# Patient Record
Sex: FEMALE | URBAN - METROPOLITAN AREA
[De-identification: names, ages, dates, MRNs, and addresses within clinical notes are randomized per-mention and may not be internally consistent; named-entity substitution may affect disease eponyms.]

---

## 2021-01-08 ENCOUNTER — APPOINTMENT (RX ONLY)
Dept: URBAN - METROPOLITAN AREA CLINIC 55 | Facility: CLINIC | Age: 56
Setting detail: DERMATOLOGY
End: 2021-01-08

## 2021-01-08 DIAGNOSIS — Z41.9 ENCOUNTER FOR PROCEDURE FOR PURPOSES OTHER THAN REMEDYING HEALTH STATE, UNSPECIFIED: ICD-10-CM

## 2021-01-08 PROCEDURE — ? SCULPTRA

## 2021-01-08 NOTE — PROCEDURE: SCULPTRA
Show Tear Troughs Volume?: Yes
Anesthesia Type: 1% lidocaine with epinephrine
Left Temple Hollow Filler Volume In Cc: 0
Lot #: [id number]
Injection Technique: The Sculptra was injected to the areas shown with a 25g cannula after prepping the skin with alcohol and/or chlorhexidine and providing appropriate anesthesia.
Show Right And Left Jawline Volume?: No
Dilution Method: The Sculptra was reconstituted with 8cc sterile water and 2cc 2% plain lidocaine for each vial.
Anesthesia Volume In Cc: 0.5
Vials Reconstituted (Required For Inventory): 1
Consent: Written consent was obtained.
Volumizer: Sculptra
Post-Care Instructions: After care instructions were provided to the patient.
Map Statement: See Attached Map for Complete Details.
Expiration Date (Month Year): 6/30/23
Detail Level: Simple
Additional Anesthesia Volume In Cc: 6

## 2021-08-04 ENCOUNTER — APPOINTMENT (RX ONLY)
Dept: URBAN - METROPOLITAN AREA CLINIC 55 | Facility: CLINIC | Age: 56
Setting detail: DERMATOLOGY
End: 2021-08-04

## 2021-08-04 DIAGNOSIS — Z41.9 ENCOUNTER FOR PROCEDURE FOR PURPOSES OTHER THAN REMEDYING HEALTH STATE, UNSPECIFIED: ICD-10-CM

## 2021-08-04 PROCEDURE — ? FILLERS

## 2021-08-04 PROCEDURE — ? BOTOX

## 2021-08-04 NOTE — PROCEDURE: BOTOX
Show Ucl Units: No
Additional Area 4 Units: 0
Glabellar Complex Units: 12
Show Nasal Units: Yes
Detail Level: Zone
Dilution (U/0.1 Cc): 4
Lot #: [id number]
Expiration Date (Month Year): 10/2023
Consent: Written consent was obtained.
Forehead Units: 14
Post-Care Instructions: After care instructions were provided to the patient.

## 2021-08-04 NOTE — PROCEDURE: FILLERS
Decollete Filler Volume In Cc: 0
Include Cannula Information In Note?: No
Expiration Date (Month Year): 01/2023
Expiration Date (Month Year): 01/2024
Include Cannula Size?: 27G
Anesthesia Volume In Cc: 0.5
Detail Level: Simple
Post-Care Instructions: After care instructions were provided to the patient.
Filler: Pia Bruno
Map Statment: See Attach Map for Complete Details
Consent: Written consent was obtained.
Filler: Restylane Silk
Lot #: [id number]
Anesthesia Type: 1% lidocaine with epinephrine
Lot #: [id number]

## 2021-12-21 ENCOUNTER — APPOINTMENT (RX ONLY)
Dept: URBAN - METROPOLITAN AREA CLINIC 55 | Facility: CLINIC | Age: 56
Setting detail: DERMATOLOGY
End: 2021-12-21

## 2021-12-21 DIAGNOSIS — Z41.9 ENCOUNTER FOR PROCEDURE FOR PURPOSES OTHER THAN REMEDYING HEALTH STATE, UNSPECIFIED: ICD-10-CM

## 2021-12-21 PROCEDURE — ? PRO-NOX

## 2021-12-21 PROCEDURE — ? FRAXEL

## 2021-12-21 NOTE — PROCEDURE: PRO-NOX
Detail Level: Zone
Post-Care Instructions: The patient was instructed to call the office if they had any lasting side effects or concerns.
Consent: Written consent obtained, risks reviewed including but not limited to euphoria, light-headedness, nausea, vomiting and dizziness.

## 2021-12-21 NOTE — PROCEDURE: FRAXEL
Indication: dyschromia
Energy(Mj/Cm2): 20
Number Of Passes: 1
Consent: Written consent obtained, risks reviewed including but not limited to pain and incomplete improvement.
Location: neck
Anesthesia Type: 1% lidocaine with epinephrine
Location: full face
Topical Anesthesia Type: 20% benzocaine, 8% lidocaine, 4% tetracaine
Treatment Number: 4
External Cooling Fan Speed: 5
Energy(Mj/Cm2): 15
Post-Care Instructions: I reviewed with the patient in detail post-care instructions. Patient should avoid sun until area fully healed.
Detail Level: Zone
Number Of Passes: 8
Total Energy In Kj (Optional- Don't Include Units): 2.12
Wavelength: 1927nm
Add Post-Care Below To The Note: No

## 2022-01-04 ENCOUNTER — APPOINTMENT (RX ONLY)
Dept: URBAN - METROPOLITAN AREA CLINIC 55 | Facility: CLINIC | Age: 57
Setting detail: DERMATOLOGY
End: 2022-01-04

## 2022-01-04 DIAGNOSIS — Z41.9 ENCOUNTER FOR PROCEDURE FOR PURPOSES OTHER THAN REMEDYING HEALTH STATE, UNSPECIFIED: ICD-10-CM

## 2022-01-04 PROCEDURE — ? BOTOX

## 2022-01-04 NOTE — PROCEDURE: BOTOX
Left Pupillary Line Units: 0
Show Right And Left Pupillary Line Units: No
Show Nasal Units: Yes
Periorbital Skin Units: 12
Forehead Units: 14
Detail Level: Detailed
Post-Care Instructions: Patient instructed to not lie down for 4 hours and limit physical activity for 24 hours.
Additional Area 1 Location: upper lip
Dilution (U/0.1 Cc): 4
Lot #: [id number]
Price (Use Numbers Only, No Special Characters Or $): 44
Consent: Written consent obtained. Risks include but not limited to lid/brow ptosis, bruising, swelling, diplopia, temporary effect, incomplete chemical denervation.
Expiration Date (Month Year): 3/31/24

## 2022-05-03 ENCOUNTER — APPOINTMENT (RX ONLY)
Dept: URBAN - METROPOLITAN AREA CLINIC 55 | Facility: CLINIC | Age: 57
Setting detail: DERMATOLOGY
End: 2022-05-03

## 2022-05-03 DIAGNOSIS — Z41.9 ENCOUNTER FOR PROCEDURE FOR PURPOSES OTHER THAN REMEDYING HEALTH STATE, UNSPECIFIED: ICD-10-CM

## 2022-05-03 PROCEDURE — ? SCULPTRA

## 2022-08-11 ENCOUNTER — APPOINTMENT (RX ONLY)
Dept: URBAN - METROPOLITAN AREA CLINIC 55 | Facility: CLINIC | Age: 57
Setting detail: DERMATOLOGY
End: 2022-08-11

## 2022-08-11 DIAGNOSIS — Z41.9 ENCOUNTER FOR PROCEDURE FOR PURPOSES OTHER THAN REMEDYING HEALTH STATE, UNSPECIFIED: ICD-10-CM

## 2022-08-11 PROCEDURE — ? INVENTORY

## 2022-08-11 PROCEDURE — ? FILLERS

## 2022-08-11 PROCEDURE — ? BOTOX

## 2022-08-11 NOTE — PROCEDURE: BOTOX
Show Forehead Units: Yes
Show Mentalis Units: No
Show Inventory Tab: Show
Anterior Platysmal Bands Units: 0
Periorbital Skin Units: 12
Forehead Units: 14
Consent was obtained.
Post-Care Instructions: Patient instructed to not lie down for 4 hours and limit physical activity for 24 hours.
Detail Level: Detailed
Dilution (U/0.1 Cc): 4

## 2022-08-11 NOTE — PROCEDURE: FILLERS
Cheeks Filler Volume In Cc: 0
Include Documentation That Aspiration Was Performed Prior To Injecting Filler:: No
Filler Comments: RHA Redensity
Filler: RHA 2
Anesthesia Type: 1% lidocaine with epinephrine
Anesthesia Volume In Cc: 0.5
Include Cannula Information In Note?: Yes
Detail Level: Detailed
Consent was obtained.
Post-Care Instructions: After care instructions were provided verbally and in writing.
Include Cannula Size?: 27G
Filler: RHA 3
Map Statment: See Attach Map for Complete Details

## 2023-01-06 ENCOUNTER — APPOINTMENT (RX ONLY)
Dept: URBAN - METROPOLITAN AREA CLINIC 55 | Facility: CLINIC | Age: 58
Setting detail: DERMATOLOGY
End: 2023-01-06

## 2023-01-06 DIAGNOSIS — Z41.9 ENCOUNTER FOR PROCEDURE FOR PURPOSES OTHER THAN REMEDYING HEALTH STATE, UNSPECIFIED: ICD-10-CM

## 2023-01-06 PROCEDURE — ? SCULPTRA

## 2023-01-06 PROCEDURE — ? BOTOX

## 2023-01-06 NOTE — PROCEDURE: BOTOX
Show Lateral Platysmal Band Units: Yes
Right Periorbital Skin Units: 0
Show Ucl Units: No
Consent was obtained.
Forehead Units: 14
Glabellar Complex Units: 12
Post-Care Instructions: Patient instructed to not lie down for 4 hours and limit physical activity for 24 hours.
Dilution (U/0.1 Cc): 4
Detail Level: Detailed
Show Inventory Tab: Show

## 2023-01-06 NOTE — PROCEDURE: SCULPTRA
Right Temple Hollow Filler Volume In Cc: 0
Show Fourth Additional Location?: Yes
Vials Reconstituted (Required For Inventory): 1
Show Right And Left Temple Volume?: No
Detail Level: Detailed
Show Inventory Tab: Show
Post-Care Instructions: After care instructions were provided to the patient.
Anesthesia Type: 1% lidocaine with epinephrine
Dilution Method: The Sculptra was reconstituted with 8 ml of sterile water and 2cc 2% plain lidocaine for each vial.
Injection Technique: The Sculptra was injected to the areas shown in black with a 22g cannula after prepping the skin with alcohol and /or chlorhexidine and providing appropriate anesthesia.
Anesthesia Volume In Cc: 0.5
Consent: Written consent obtained.

## 2023-08-17 ENCOUNTER — APPOINTMENT (RX ONLY)
Dept: URBAN - METROPOLITAN AREA CLINIC 55 | Facility: CLINIC | Age: 58
Setting detail: DERMATOLOGY
End: 2023-08-17

## 2023-08-17 DIAGNOSIS — Z41.9 ENCOUNTER FOR PROCEDURE FOR PURPOSES OTHER THAN REMEDYING HEALTH STATE, UNSPECIFIED: ICD-10-CM

## 2023-08-17 PROCEDURE — ? INVENTORY

## 2023-08-17 PROCEDURE — ? BOTOX

## 2023-08-17 PROCEDURE — ? FILLERS

## 2023-08-17 NOTE — PROCEDURE: FILLERS
Additional Area 1 Volume In Cc: 0
Include Documentation That Aspiration Was Performed Prior To Injecting Filler:: No
Include Cannula Size?: 27G
Anesthesia Type: 1% lidocaine with epinephrine
Filler: RHA 3
Filler: RHA Redensity
Anesthesia Volume In Cc: 0.5
Consent was obtained.
Detail Level: Detailed
Post-Care Instructions: After care instructions were provided verbally and in writing.
Include Cannula Information In Note?: Yes
Map Statment: See Attach Map for Complete Details

## 2023-08-17 NOTE — PROCEDURE: BOTOX
Forehead Units: 14
Forehead Units: 0
Show Glabellar Units: Yes
Show Inventory Tab: Show
Glabellar Complex Units: 12
Show Mentalis Units: No
Consent was obtained.
Incrementing Botox Units: By 0.5 Units
Dilution (U/0.1 Cc): 4
Detail Level: Detailed
Post-Care Instructions: Patient instructed to not lie down for 4 hours and limit physical activity for 24 hours.

## 2023-12-07 ENCOUNTER — APPOINTMENT (RX ONLY)
Dept: URBAN - METROPOLITAN AREA CLINIC 55 | Facility: CLINIC | Age: 58
Setting detail: DERMATOLOGY
End: 2023-12-07

## 2023-12-07 DIAGNOSIS — Z41.9 ENCOUNTER FOR PROCEDURE FOR PURPOSES OTHER THAN REMEDYING HEALTH STATE, UNSPECIFIED: ICD-10-CM

## 2023-12-07 PROCEDURE — ? SCULPTRA

## 2023-12-07 NOTE — PROCEDURE: SCULPTRA
Show Map Statement?: Yes
Chin Filler Volume In Cc: 0
Use Map Statement For Sites (Optional): No
Vials Reconstituted (Required For Inventory): 2
Anesthesia Type: 1% lidocaine with epinephrine
Dilution Method: The Sculptra was reconstituted with 8 ml of sterile water and 2cc 2% plain lidocaine for each vial.
Treatment Number: 4
Consent: Written consent obtained.
Detail Level: Detailed
Injection Technique: The Sculptra was injected to the areas shown in black with a 25g cannula after prepping the skin with alcohol and /or chlorhexidine and providing appropriate anesthesia.
Anesthesia Volume In Cc: 0.5
Show Inventory Tab: Show
Post-Care Instructions: After care instructions were provided to the patient.

## 2024-05-15 ENCOUNTER — APPOINTMENT (RX ONLY)
Dept: URBAN - METROPOLITAN AREA CLINIC 55 | Facility: CLINIC | Age: 59
Setting detail: DERMATOLOGY
End: 2024-05-15

## 2024-05-15 DIAGNOSIS — Z41.9 ENCOUNTER FOR PROCEDURE FOR PURPOSES OTHER THAN REMEDYING HEALTH STATE, UNSPECIFIED: ICD-10-CM

## 2024-05-15 PROCEDURE — ? SCULPTRA

## 2024-05-15 PROCEDURE — ? BOTOX

## 2024-05-15 NOTE — PROCEDURE: SCULPTRA
Treatment Number: 0
Show Right And Left Middle Malar Volume?: No
Show Fourth Additional Location?: Yes
Post-Care Instructions: After care instructions were provided to the patient.
Consent: Written consent obtained.
Show Inventory Tab: Show
Anesthesia Volume In Cc: 0.5
Injection Technique: The Sculptra was injected to the areas shown in black with a 25g cannula after prepping the skin with alcohol and /or chlorhexidine and providing appropriate anesthesia.
Dilution Method: The Sculptra was reconstituted with 8 ml of sterile water and 2cc 2% plain lidocaine for each vial.
Anesthesia Type: 1% lidocaine with epinephrine
Detail Level: Detailed
Vials Reconstituted (Required For Inventory): 1

## 2024-08-08 ENCOUNTER — APPOINTMENT (RX ONLY)
Dept: URBAN - METROPOLITAN AREA CLINIC 55 | Facility: CLINIC | Age: 59
Setting detail: DERMATOLOGY
End: 2024-08-08

## 2024-08-08 DIAGNOSIS — Z41.9 ENCOUNTER FOR PROCEDURE FOR PURPOSES OTHER THAN REMEDYING HEALTH STATE, UNSPECIFIED: ICD-10-CM

## 2024-08-08 PROCEDURE — ? BOTOX

## 2024-08-08 NOTE — PROCEDURE: BOTOX
Levator Labii Superioris Units: 0
Show Levator Superior Units: Yes
Show Right And Left Brow Units: No
Show Inventory Tab: Show
Glabellar Complex Units: 12
Forehead Units: 14
Detail Level: Detailed
Incrementing Botox Units: By 0.5 Units
Dilution (U/0.1 Cc): 4
Post-Care Instructions: Patient instructed to not lie down for 4 hours and limit physical activity for 24 hours.
Consent was obtained.

## 2024-12-19 ENCOUNTER — APPOINTMENT (OUTPATIENT)
Dept: URBAN - METROPOLITAN AREA CLINIC 55 | Facility: CLINIC | Age: 59
Setting detail: DERMATOLOGY
End: 2024-12-19

## 2024-12-19 DIAGNOSIS — Z41.9 ENCOUNTER FOR PROCEDURE FOR PURPOSES OTHER THAN REMEDYING HEALTH STATE, UNSPECIFIED: ICD-10-CM

## 2024-12-19 PROCEDURE — ? BOTOX

## 2024-12-19 PROCEDURE — ? INVENTORY

## 2024-12-19 PROCEDURE — ? IN-HOUSE DISPENSING PHARMACY

## 2024-12-19 NOTE — PROCEDURE: IN-HOUSE DISPENSING [facility]ARMAC[facility]
Product 14 Refills: 0
Product 2 Amount/Unit (Numbers Only): 30
Product 6 Unit Type: ml
Product 30 Unit Type: mg
Product 1 Refills: 2
Product 3 Application Directions: Apply nightly or as directed. Use SPF daily.
Send Charges To Patient Encounter: No
Product 2 Refills: 11
Name Of Product 3: Acne Triple Combination Gel
Name Of Product 1: Anti-Aging Brightening Cream
Product 2 Application Directions: Apply nightly or as directed.
Name Of Product 7: Lidocaine 23% / Tetracaine 7% Cream
Render Refills If Set To 0: Yes
Name Of Product 4: Hydroquinone 8% Emulsion
Name Of Product 2: Dapsone / Spironolactone Gel
Product 7 Application Directions: Apply only as directed
Name Of Product 6: Rosacea Triple Combination Gel
Detail Level: Zone
Product 4 Units Dispensed: 1
Name Of Product 5: Hydrating Tretinoin 0.025% Cream

## 2024-12-19 NOTE — PROCEDURE: BOTOX
R Brow Units: 0
Show Depressor Anguli Units: Yes
Glabellar Complex Units: 12
Dilution (U/0.1 Cc): 4
Post-Care Instructions: Patient instructed to not lie down for 4 hours and limit physical activity for 24 hours.
Consent was obtained.
Show Right And Left Periorbital Units: No
Incrementing Botox Units: By 0.5 Units
Detail Level: Detailed
Forehead Units: 14
Show Inventory Tab: Show

## 2024-12-23 ENCOUNTER — RX ONLY (RX ONLY)
Age: 59
End: 2024-12-23

## 2024-12-23 RX ORDER — HYDROQUINONE 4 %
CREAM (GRAM) TOPICAL
Qty: 30 | Refills: 0 | Status: ERX | COMMUNITY
Start: 2024-12-23

## 2025-01-15 ENCOUNTER — APPOINTMENT (OUTPATIENT)
Dept: URBAN - METROPOLITAN AREA CLINIC 55 | Facility: CLINIC | Age: 60
Setting detail: DERMATOLOGY
End: 2025-01-15

## 2025-01-15 DIAGNOSIS — Z41.9 ENCOUNTER FOR PROCEDURE FOR PURPOSES OTHER THAN REMEDYING HEALTH STATE, UNSPECIFIED: ICD-10-CM

## 2025-01-15 PROCEDURE — ? INVENTORY

## 2025-01-15 PROCEDURE — ? FILLERS

## 2025-01-15 NOTE — PROCEDURE: FILLERS
Tear Troughs Filler Volume In Cc: 0
Include Cannula Information In Note?: No
Anesthesia Volume In Cc: 0.5
Detail Level: Detailed
Filler: RHA Redensity
Include Cannula Size?: 27G
Map Statment: See Attach Map for Complete Details
Consent was obtained.
Post-Care Instructions: After care instructions were provided verbally and in writing.
Anesthesia Type: 1% lidocaine with epinephrine

## 2025-03-05 ENCOUNTER — APPOINTMENT (OUTPATIENT)
Dept: URBAN - METROPOLITAN AREA CLINIC 55 | Facility: CLINIC | Age: 60
Setting detail: DERMATOLOGY
End: 2025-03-05

## 2025-03-05 DIAGNOSIS — Z41.9 ENCOUNTER FOR PROCEDURE FOR PURPOSES OTHER THAN REMEDYING HEALTH STATE, UNSPECIFIED: ICD-10-CM

## 2025-03-05 PROCEDURE — ? BOTOX

## 2025-03-05 NOTE — PROCEDURE: BOTOX
Left Pupillary Line Units: 0
Show Nasal Units: Yes
Show Inventory Tab: Show
Forehead Units: 14
Consent was obtained.
Post-Care Instructions: Patient instructed to not lie down for 4 hours and limit physical activity for 24 hours.
Dilution (U/0.1 Cc): 4
Show Ucl Units: No
Glabellar Complex Units: 12
Incrementing Botox Units: By 0.5 Units
Detail Level: Detailed

## 2025-06-11 ENCOUNTER — APPOINTMENT (OUTPATIENT)
Dept: URBAN - METROPOLITAN AREA CLINIC 55 | Facility: CLINIC | Age: 60
Setting detail: DERMATOLOGY
End: 2025-06-11

## 2025-06-11 DIAGNOSIS — Z41.9 ENCOUNTER FOR PROCEDURE FOR PURPOSES OTHER THAN REMEDYING HEALTH STATE, UNSPECIFIED: ICD-10-CM

## 2025-06-11 PROCEDURE — ? MICRONEEDLING

## 2025-06-11 PROCEDURE — ? INVENTORY

## 2025-06-11 NOTE — PROCEDURE: MICRONEEDLING
Depth In Mm (Location #2): 0.1
Topical Anesthesia?: 20% benzocaine, 8% lidocaine, 4% tetracaine
# Of Treatments In Package: 0
Consent obtained, risks reviewed.
Detail Level: Zone
Post-Care Instructions: Alastin Skin Nectar and SPF applied immediately post procedure. \\nAfter care instruction provided to patient.

## 2025-07-09 ENCOUNTER — APPOINTMENT (OUTPATIENT)
Dept: URBAN - METROPOLITAN AREA CLINIC 55 | Facility: CLINIC | Age: 60
Setting detail: DERMATOLOGY
End: 2025-07-09

## 2025-07-09 DIAGNOSIS — Z41.9 ENCOUNTER FOR PROCEDURE FOR PURPOSES OTHER THAN REMEDYING HEALTH STATE, UNSPECIFIED: ICD-10-CM

## 2025-07-09 PROCEDURE — ? MICRONEEDLING

## 2025-07-09 PROCEDURE — ? INVENTORY

## 2025-07-09 NOTE — PROCEDURE: MICRONEEDLING
Post-Care Instructions: Alastin Skin Nectar and SPF applied immediately post procedure. \\nAfter care instruction provided to patient.
Depth In Mm (Location #2): 0.1
Topical Anesthesia?: 20% benzocaine, 8% lidocaine, 4% tetracaine
Detail Level: Zone
Treatment Number (Optional): 0
Consent obtained, risks reviewed.

## 2025-07-22 ENCOUNTER — APPOINTMENT (OUTPATIENT)
Dept: URBAN - METROPOLITAN AREA CLINIC 55 | Facility: CLINIC | Age: 60
Setting detail: DERMATOLOGY
End: 2025-07-22

## 2025-07-22 DIAGNOSIS — Z41.9 ENCOUNTER FOR PROCEDURE FOR PURPOSES OTHER THAN REMEDYING HEALTH STATE, UNSPECIFIED: ICD-10-CM

## 2025-07-22 PROCEDURE — ? FILLERS

## 2025-07-22 PROCEDURE — ? INVENTORY

## 2025-07-22 NOTE — PROCEDURE: FILLERS
Cheeks Filler Volume In Cc: 0
Additional Anesthesia Volume In Cc: 6
Include Cannula Size?: 25G
Aspiration Statement: Aspiration was performed prior to injecting site with filler.
Include Cannula Information In Note?: No
Include Cannula Size?: 27G
Consent: Written consent obtained. Risks include but not limited to bruising, beading, irregular texture, ulceration, infection, allergic reaction, scar formation, incomplete augmentation, temporary nature, and procedural pain.
Filler: RHA Redensity
Post-Care Instructions: After the procedure, patient instructed to apply ice to reduce swelling.
Detail Level: Detailed
Anesthesia Type: 1% lidocaine with epinephrine
Include Cannula Information In Note?: Yes
Anesthesia Volume In Cc: 0.5
Map Statment: See Attach Map for Complete Details

## 2025-08-07 ENCOUNTER — APPOINTMENT (OUTPATIENT)
Dept: URBAN - METROPOLITAN AREA CLINIC 55 | Facility: CLINIC | Age: 60
Setting detail: DERMATOLOGY
End: 2025-08-07

## 2025-08-07 DIAGNOSIS — Z41.9 ENCOUNTER FOR PROCEDURE FOR PURPOSES OTHER THAN REMEDYING HEALTH STATE, UNSPECIFIED: ICD-10-CM

## 2025-08-07 PROCEDURE — ? COSMETIC FOLLOW-UP

## 2025-08-07 PROCEDURE — ? INVENTORY

## 2025-08-07 PROCEDURE — ? PLATELET RICH PLASMA TOPICAL

## 2025-08-13 ENCOUNTER — APPOINTMENT (OUTPATIENT)
Dept: URBAN - METROPOLITAN AREA CLINIC 55 | Facility: CLINIC | Age: 60
Setting detail: DERMATOLOGY
End: 2025-08-13

## 2025-08-13 DIAGNOSIS — Z41.9 ENCOUNTER FOR PROCEDURE FOR PURPOSES OTHER THAN REMEDYING HEALTH STATE, UNSPECIFIED: ICD-10-CM

## 2025-08-13 PROCEDURE — ? BOTOX

## 2025-08-19 ENCOUNTER — APPOINTMENT (OUTPATIENT)
Dept: URBAN - METROPOLITAN AREA CLINIC 55 | Facility: CLINIC | Age: 60
Setting detail: DERMATOLOGY
End: 2025-08-19

## 2025-08-19 DIAGNOSIS — Z41.9 ENCOUNTER FOR PROCEDURE FOR PURPOSES OTHER THAN REMEDYING HEALTH STATE, UNSPECIFIED: ICD-10-CM

## 2025-08-19 PROCEDURE — ? FILLERS

## 2025-08-19 PROCEDURE — ? INVENTORY
